# Patient Record
Sex: FEMALE | Race: BLACK OR AFRICAN AMERICAN | Employment: UNEMPLOYED | URBAN - METROPOLITAN AREA
[De-identification: names, ages, dates, MRNs, and addresses within clinical notes are randomized per-mention and may not be internally consistent; named-entity substitution may affect disease eponyms.]

---

## 2020-07-13 NOTE — PROGRESS NOTES
2020    PATIENT: Sean Angelucci  : 2000    Primary Care Provider:   Rachel Mazariegos MD  o:None  f:None      Reason for evaluation:   Chief Complaint   Patient presents with    New Patient     off and on chest pains - happens when laying down or sitting    Chest Pain       History of present illness:   Ms. Sean Angelucci is a 23 y.o. female patient here for first time cardiovascular evaluation regarding recent onset chest pain/palpitations. Michael Painter is preparing to enter her june year in [employer] @ [employer]. She has been home since mid March due to [place] pandemic and states symptoms did not start until two weeks ago. Denies any preceding stressors. States she felt episodes daily while sitting of \"irregular, harder but not faster heartbeats with pain from center to arms and jaws, that sometimes would resolve with coughing and others on their own in several minutes\". She has not exercised in the past three weeks and denies any previous exertional concerns. Denies history of exertional chest pain, dyspnea, palpitations, or syncope. She denies and medical or surgical history. She has been on oral contraceptive pills. Denies ETOH, tobacco, or drug use. States caffeine is minimal with occasional tea drinking. Family history consists of CABG in great grandmother, MI in great grandfather, AFib ablations/cardiomyopathy in maternal grandfather, and ICD in maternal grandmother according to her mother who is here today. No premature ischemic heart disease or SCD reported. Medical History:  History reviewed. No pertinent past medical history. Surgical History:  History reviewed. No pertinent surgical history.     Social History:  Social History     Socioeconomic History    Marital status: Single     Spouse name: Not on file    Number of children: Not on file    Years of education: Not on file    Highest education level: Not on file Occupational History    Not on file   Social Needs    Financial resource strain: Not on file    Food insecurity     Worry: Not on file     Inability: Not on file    Transportation needs     Medical: Not on file     Non-medical: Not on file   Tobacco Use    Smoking status: Never Smoker    Smokeless tobacco: Never Used   Substance and Sexual Activity    Alcohol use: Never     Frequency: Never    Drug use: Never    Sexual activity: Not on file   Lifestyle    Physical activity     Days per week: Not on file     Minutes per session: Not on file    Stress: Not on file   Relationships    Social connections     Talks on phone: Not on file     Gets together: Not on file     Attends Roman Catholic service: Not on file     Active member of club or organization: Not on file     Attends meetings of clubs or organizations: Not on file     Relationship status: Not on file    Intimate partner violence     Fear of current or ex partner: Not on file     Emotionally abused: Not on file     Physically abused: Not on file     Forced sexual activity: Not on file   Other Topics Concern    Not on file   Social History Narrative    Not on file        Family History:  No evidence for sudden cardiac death or premature CAD. Problem Relation Age of Onset    No Known Problems Mother     No Known Problems Father     High Cholesterol Maternal Grandmother     Pacemaker Maternal Grandmother     High Cholesterol Maternal Grandfather     Hypertension Maternal Grandfather     Heart Surgery Maternal Grandfather        Medications:  [x] Medications and dosages reviewed. Prior to Admission medications    Medication Sig Start Date End Date Taking? Authorizing Provider   LO LOESTRIN FE 1 MG-10 MCG / 10 MCG tablet TK 1 T PO QD 7/7/20   Historical Provider, MD       Allergies:  Patient has no known allergies.      Review of Systems:    [x]Full ROS obtained and negative except as mentioned in HPI    Physical Examination:    /72 (Site: Left Upper Arm, Position: Sitting, Cuff Size: Medium Adult)   Pulse 86   Ht 5' 5\" (1.651 m)   Wt 139 lb (63 kg)   SpO2 98%   BMI 23.13 kg/m²   Wt Readings from Last 3 Encounters:   07/14/20 139 lb (63 kg) (67 %, Z= 0.45)*     * Growth percentiles are based on [facility] (Girls, 2-20 Years) data. Vitals:    07/14/20 1032   BP: 100/72   Pulse: 86   SpO2: 98%       · GENERAL: Well developed, well nourished, no acute distress  · NEUROLOGICAL: Alert and oriented x3  · PSYCH: Normal mood and affect   · SKIN: Warm and dry  · HEENT: Normocephalic, atraumatic, Sclera non-icteric, mucous membranes moist  · NECK: supple, JVP normal  · CARDIAC: Normal PMI, regular rate and rhythm, normal S1S2, no murmur, rub, or gallop  · RESPIRATORY: Normal respiratory effort, clear to auscultation bilaterally  · EXTREMITIES: no edema or clubbing, +2 pulses bilaterally   · MUSCULOSKELETAL: No joint swelling or tenderness, no chest wall tenderness  · GASTROINTESTINAL: normal bowel sounds, soft, non-tender      Imaging:  I have reviewed the below testing personally:    EKG 7/14/20  SR      Impression/Recommendations    Ms. Gray Connell is a 23 y.o. female patient with:    1. Chest pain, unspecified type    2. Palpitations    3. Family history of cardiomyopathy      Christine is with recent onset palpitations at rest. She is without high risk features, history, or profile as detailed above. She is leaving with Holter monitor and has echocardiogram scheduled before she leaves for college. We can discuss these results by phone if necessary. She plans to follow up on her annual bloodwork with her PCP. Orders Placed This Encounter   Procedures    Holter monitor 48 hour     2 days     Standing Status:   Future     Standing Expiration Date:   7/14/2021     Order Specific Question:   Reason for Exam?     Answer:   Irregular heart rate     Order Specific Question:   Reason for Exam?     Answer:    Tachycardia    EKG 12 lead     Order Specific Question:   Reason for Exam?     Answer:   Chest pain    Echo 2D w doppler w color complete     Standing Status:   Future     Standing Expiration Date:   7/14/2021     Order Specific Question:   Reason for exam:     Answer:   family history of cardiomyopathy, palpitations     Return for Testing . Patient Instructions   Holter monitor 48 hours  Echocradiogram        Thank you for allowing me to participate in the care of your patient. Please do not hesitate to call. Gamal Sam DO, [facility]  Interventional Cardiology       [address] [facility], [address] [facility]  o: [phone]      NOTE:  This report was transcribed using voice recognition software. Every effort was made to ensure accuracy; however, inadvertent computerized transcription errors may be present. Scribe's Attestation: This note was scribed in the presence of Dr. Mary Jarvis DO by Luis Ash RN.    I, Gamal Sam, have personally performed the services described in this documentation as scribed by Butch Lentz RN in my presence, and it is both accurate and complete. An electronic signature was used to authenticate this note.

## 2020-07-14 ENCOUNTER — OFFICE VISIT (OUTPATIENT)
Dept: CARDIOLOGY CLINIC | Age: 20
End: 2020-07-14
Payer: COMMERCIAL

## 2020-07-14 VITALS
DIASTOLIC BLOOD PRESSURE: 72 MMHG | BODY MASS INDEX: 23.16 KG/M2 | OXYGEN SATURATION: 98 % | HEART RATE: 86 BPM | SYSTOLIC BLOOD PRESSURE: 100 MMHG | HEIGHT: 65 IN | WEIGHT: 139 LBS

## 2020-07-14 PROCEDURE — 1036F TOBACCO NON-USER: CPT | Performed by: INTERNAL MEDICINE

## 2020-07-14 PROCEDURE — 99204 OFFICE O/P NEW MOD 45 MIN: CPT | Performed by: INTERNAL MEDICINE

## 2020-07-14 PROCEDURE — G8427 DOCREV CUR MEDS BY ELIG CLIN: HCPCS | Performed by: INTERNAL MEDICINE

## 2020-07-14 PROCEDURE — G8420 CALC BMI NORM PARAMETERS: HCPCS | Performed by: INTERNAL MEDICINE

## 2020-07-14 PROCEDURE — 93000 ELECTROCARDIOGRAM COMPLETE: CPT | Performed by: INTERNAL MEDICINE

## 2020-07-14 RX ORDER — NORETHINDRONE ACETATE AND ETHINYL ESTRADIOL, ETHINYL ESTRADIOL AND FERROUS FUMARATE 1MG-10(24)
KIT ORAL
COMMUNITY
Start: 2020-07-07

## 2020-07-14 SDOH — HEALTH STABILITY: MENTAL HEALTH: HOW OFTEN DO YOU HAVE A DRINK CONTAINING ALCOHOL?: NEVER

## 2020-07-14 NOTE — LETTER
[address] [place] [address] Cardiology [place] [address] [address] [address]  Phone: [phone]  Fax: [phone]    Jaren HicksDO        July 15, 2020     Joaquina Veronica [phone]    Patient: Tonja Joseph  MR Number: [id number]  YOB: 2000  Date of Visit: 2020    Dear Dr. Joaquina Veronica:                                         2020    PATIENT: Tonja Joseph  : 2000    Primary Care Provider:   Joaquina Veronica MD  o:None  f:None      Reason for evaluation:   Chief Complaint   Patient presents with    New Patient     off and on chest pains - happens when laying down or sitting    Chest Pain       History of present illness:   Ms. Tonja Joseph is a 23 y.o. female patient here for first time cardiovascular evaluation regarding recent onset chest pain/palpitations. Denise Olivarez is preparing to enter her june year in [employer] @ [employer]. She has been home since mid March due to [place] pandemic and states symptoms did not start until two weeks ago. Denies any preceding stressors. States she felt episodes daily while sitting of \"irregular, harder but not faster heartbeats with pain from center to arms and jaws, that sometimes would resolve with coughing and others on their own in several minutes\". She has not exercised in the past three weeks and denies any previous exertional concerns. Denies history of exertional chest pain, dyspnea, palpitations, or syncope. She denies and medical or surgical history. She has been on oral contraceptive pills. Denies ETOH, tobacco, or drug use. States caffeine is minimal with occasional tea drinking. Family history consists of CABG in great grandmother, MI in great grandfather, AFib ablations/cardiomyopathy in maternal grandfather, and ICD in maternal grandmother according to her mother who is here today. No premature ischemic heart disease or SCD reported. [x] Medications and dosages reviewed. Prior to Admission medications    Medication Sig Start Date End Date Taking? Authorizing Provider   LO LOESTRIN FE 1 MG-10 MCG / 10 MCG tablet TK 1 T PO QD 7/7/20   Historical Provider, MD       Allergies:  Patient has no known allergies. Review of Systems:    [x]Full ROS obtained and negative except as mentioned in HPI    Physical Examination:    /72 (Site: Left Upper Arm, Position: Sitting, Cuff Size: Medium Adult)   Pulse 86   Ht 5' 5\" (1.651 m)   Wt 139 lb (63 kg)   SpO2 98%   BMI 23.13 kg/m²   Wt Readings from Last 3 Encounters:   07/14/20 139 lb (63 kg) (67 %, Z= 0.45)*     * Growth percentiles are based on CDC (Girls, 2-20 Years) data. Vitals:    07/14/20 1032   BP: 100/72   Pulse: 86   SpO2: 98%       · GENERAL: Well developed, well nourished, no acute distress  · NEUROLOGICAL: Alert and oriented x3  · PSYCH: Normal mood and affect   · SKIN: Warm and dry  · HEENT: Normocephalic, atraumatic, Sclera non-icteric, mucous membranes moist  · NECK: supple, JVP normal  · CARDIAC: Normal PMI, regular rate and rhythm, normal S1S2, no murmur, rub, or gallop  · RESPIRATORY: Normal respiratory effort, clear to auscultation bilaterally  · EXTREMITIES: no edema or clubbing, +2 pulses bilaterally   · MUSCULOSKELETAL: No joint swelling or tenderness, no chest wall tenderness  · GASTROINTESTINAL: normal bowel sounds, soft, non-tender      Imaging:  I have reviewed the below testing personally:    EKG 7/14/20  SR      Impression/Recommendations    Ms. Aroldo Colon is a 23 y.o. female patient with:    1. Chest pain, unspecified type    2. Palpitations    3. Family history of cardiomyopathy      Christine is with recent onset palpitations at rest. She is without high risk features, history, or profile as detailed above.  She is leaving with Holter monitor and has echocardiogram scheduled before she leaves for [employer] [facility]. We can discuss these results by phone if necessary. She plans to follow up on her annual bloodwork with her PCP. Orders Placed This Encounter   Procedures    Holter monitor 48 hour     2 days     Standing Status:   Future     Standing Expiration Date:   7/14/2021     Order Specific Question:   Reason for Exam?     Answer:   Irregular heart rate     Order Specific Question:   Reason for Exam?     Answer: Tachycardia    EKG 12 lead     Order Specific Question:   Reason for Exam?     Answer:   Chest pain    Echo 2D w doppler w color complete     Standing Status:   Future     Standing Expiration Date:   7/14/2021     Order Specific Question:   Reason for exam:     Answer:   family history of cardiomyopathy, palpitations     Return for Testing . Patient Instructions   Holter monitor 48 hours  Echocradiogram        Thank you for allowing me to participate in the care of your patient. Please do not hesitate to call. Dede Young DO, [facility]  Interventional Cardiology       [facility] [address] [facility], [address] [place] [address]  o: [phone]      NOTE:  This report was transcribed using voice recognition software. Every effort was made to ensure accuracy; however, inadvertent computerized transcription errors may be present. Scribe's Attestation: This note was scribed in the presence of Dr. Mae Vásquez DO by Cipriano Brand, CHRISTINA.    I, Dede Young, have personally performed the services described in this documentation as scribed by Roger Lentz RN in my presence, and it is both accurate and complete. An electronic signature was used to authenticate this note.          2 day holter monitor placed on pt and pt understood instructions        Sincerely,        [address] [place] [address]

## 2020-07-16 ENCOUNTER — HOSPITAL ENCOUNTER (OUTPATIENT)
Dept: NON INVASIVE DIAGNOSTICS | Age: 20
Discharge: HOME | End: 2020-07-16
Payer: COMMERCIAL

## 2020-07-16 LAB
LV EF: 65 %
LVEF MODALITY: NORMAL

## 2020-07-16 PROCEDURE — 93306 TTE W/DOPPLER COMPLETE: CPT

## 2020-07-17 NOTE — RESULT ENCOUNTER NOTE
Echo stable-reviewed by [facility]. Awaiting holter monitor results. Gave results to [facility] via phone.

## 2020-07-27 PROCEDURE — 93224 XTRNL ECG REC UP TO 48 HRS: CPT | Performed by: INTERNAL MEDICINE

## 2020-07-30 ENCOUNTER — TELEPHONE (OUTPATIENT)
Dept: CARDIOLOGY CLINIC | Age: 20
End: 2020-07-30

## 2020-07-30 NOTE — TELEPHONE ENCOUNTER
Pt calling had an heart monitor  couple weeks ago and no one called with the results.  Pls call to advise Thank you

## 2020-08-03 NOTE — RESULT ENCOUNTER NOTE
BNN reviewed- holter stable, SR. Can fu PRN. Attempted to call Christine, no answer unable to leave normal results in VM (see HIPPA form), lvm on home phone for callback to discuss.

## 2024-01-03 ENCOUNTER — HOSPITAL ENCOUNTER (OUTPATIENT)
Dept: PHYSICAL THERAPY | Age: 24
Setting detail: THERAPIES SERIES
Discharge: HOME | End: 2024-01-03
Payer: COMMERCIAL

## 2024-01-03 DIAGNOSIS — M54.50 BILATERAL LOW BACK PAIN WITHOUT SCIATICA, UNSPECIFIED CHRONICITY: Primary | ICD-10-CM

## 2024-01-03 DIAGNOSIS — Z78.9 DEFICIT IN ACTIVITIES OF DAILY LIVING (ADL): ICD-10-CM

## 2024-01-03 PROCEDURE — 97110 THERAPEUTIC EXERCISES: CPT

## 2024-01-03 PROCEDURE — 97161 PT EVAL LOW COMPLEX 20 MIN: CPT

## 2024-01-03 PROCEDURE — 97530 THERAPEUTIC ACTIVITIES: CPT

## 2024-01-03 NOTE — FLOWSHEET NOTE
01/03/2024     Note: If patient does not return for scheduled/recommended follow up visits, this note will serve as a discharge from care along with the most recent update on progress.

## 2024-01-03 NOTE — PLAN OF CARE
carrying tasks  Reduced ability to squat    Participation Restrictions:   Reduced participation in social activities  Reduced participation in sports/recreation    Classification :   Other: S/S consistent with lumbar muscle strain    Barriers to/and or personal factors that will affect rehab potential:   None noted    Prognosis/Rehab Potential:  [] Excellent     [x] Good     [] Fair     [] Poor         Tolerance of evaluation/treatment:   [] Excellent     [x] Good     [] Fair     [] Poor      Physical Therapy Evaluation Complexity Justification  [x] A history of present problem and no personal factors and/or co-morbidities that impact the plan of care  [x] A total of 1-2 elements  found upon examination of body systems using standardized tests and measures addressing any of the following: body structures, functions (impairments), activity limitations, and/or participation restrictions  [x] A clinical presentation with stable and/or uncomplicated characteristics   [x] Clinical decision making of LOW (61398 - Typically 20 minutes face-to-face) complexity using standardized patient assessment instrument and/or measurable assessment of functional outcome.    GOALS     Patient stated goal: \"to get 100% of my body back and be able to move freely and without pain\"  Status: [] Progressing: [] Met: [] Not Met: [] Adjusted    Therapist goals for Patient:   Short Term Goals: To be achieved in: 2 weeks  Independent in HEP and progression per patient tolerance, in order to progress toward full function and prevent re-injury.    Status: [] Progressing: [] Met: [] Not Met: [] Adjusted  Patient will have a decrease in pain to 0/10 to help  facilitate improvement in movement, function, and ADLs as indicated by functional deficits.   Status: [] Progressing: [] Met: [] Not Met: [] Adjusted    Long Term Goals: To be achieved in: 6 weeks  Disability index score of 10% or less for the Modified Oswestry to assist with return top prior

## 2024-01-10 ENCOUNTER — HOSPITAL ENCOUNTER (OUTPATIENT)
Dept: PHYSICAL THERAPY | Age: 24
Setting detail: THERAPIES SERIES
Discharge: HOME | End: 2024-01-10
Payer: COMMERCIAL

## 2024-01-10 PROCEDURE — 97110 THERAPEUTIC EXERCISES: CPT

## 2024-01-10 PROCEDURE — 97112 NEUROMUSCULAR REEDUCATION: CPT

## 2024-01-10 NOTE — FLOWSHEET NOTE
[facility] - Outpatient Rehabilitation and Therapy [address], [place] [address] office: [phone] fax: [phone]      Physical Therapy: TREATMENT/PROGRESS NOTE   Patient: Christine Parikh (23 y.o. female)   Treatment Date: 01/10/2024   :  2000 MRN: [id number]   Visit #: 2   Insurance Allowable Auth Needed   ??? []Yes    []No    Insurance: Payor: BCBS / Plan: BCBS - [place] PPO / Product Type: *No Product type* /   Insurance ID: [id number] - ([place] BCBS)  Secondary Insurance (if applicable):    Treatment Diagnosis:     ICD-10-CM    1. Bilateral low back pain without sciatica, unspecified chronicity  M54.50       2. Deficit in activities of daily living (ADL)  Z78.9          Medical Diagnosis:    Strain of muscle, fascia and tendon of lower back, initial encounter [S39.012A]   Referring Physician: Cipriano Booker  PCP: Kathleen Keys MD                             Plan of care signed (Y/N):     Date of Patient follow up with Physician:      Progress Report/POC: NO  POC update due: (10 visits /OR AUTH LIMITS, whichever is less)  2024      Precautions/Contraindications: n/a    Preferred Language for Healthcare:   [x]English       []other:    SUBJECTIVE EXAMINATION     Patient Report/Comments: Pt states that she has generally been feeling the same since IE. She reports HEP compliance and reports generally no issues -      Test used Initial score  1/3/24 01/10/2024   Pain Summary VAS 0-4/10 0/10   Functional questionnaire Modified Oswestry 7 / 14%     Other:                OBJECTIVE EXAMINATION     Observation:     Test measurements: see eval    Exercises/Interventions:     Therapeutic Ex (41519)  resistance Sets/time Reps Notes/Cues/Progressions          Open book    HEP   SKTC    HEP   Piriformis stretch    HEP   Bridge    HEP   Quadruped thread the needle  1 10 R/L    Leg overs    HEP   Prone superman  5\" 10 110 reports of feeling mild increased pain and compression in

## 2024-01-12 ENCOUNTER — HOSPITAL ENCOUNTER (OUTPATIENT)
Dept: PHYSICAL THERAPY | Age: 24
Setting detail: THERAPIES SERIES
Discharge: HOME | End: 2024-01-12
Payer: COMMERCIAL

## 2024-01-12 PROCEDURE — 97530 THERAPEUTIC ACTIVITIES: CPT

## 2024-01-12 PROCEDURE — 97110 THERAPEUTIC EXERCISES: CPT

## 2024-01-12 NOTE — FLOWSHEET NOTE
[facility] - Outpatient Rehabilitation and Therapy [address], [place] [address] office: [phone] fax: [phone]      Physical Therapy: TREATMENT/PROGRESS NOTE   Patient: Christine Parikh (23 y.o. female)   Treatment Date: 2024   :  2000 MRN: [id number]   Visit #: 3   Insurance Allowable Auth Needed   ??? []Yes    []No    Insurance: Payor: BCBS / Plan: BCBS - [place] PPO / Product Type: *No Product type* /   Insurance ID: [id number] - ([place] BC)  Secondary Insurance (if applicable):    Treatment Diagnosis:     ICD-10-CM    1. Bilateral low back pain without sciatica, unspecified chronicity  M54.50       2. Deficit in activities of daily living (ADL)  Z78.9          Medical Diagnosis:    Strain of muscle, fascia and tendon of lower back, initial encounter [S39.012A]   Referring Physician: Cipriano Booker  PCP: Kathleen Keys MD                             Plan of care signed (Y/N):     Date of Patient follow up with Physician:      Progress Report/POC: NO  POC update due: (10 visits /OR AUTH LIMITS, whichever is less)  2024      Precautions/Contraindications: n/a    Preferred Language for Healthcare:   [x]English       []other:    SUBJECTIVE EXAMINATION     Patient Report/Comments: Pt states that she was feeling sore following last session due to increased muscular engagement exercises. No pain, though. She reports no pain coming into today's session.     Test used Initial score  1/3/24 2024   Pain Summary VAS 0-4/10 0/10   Functional questionnaire Modified Oswestry 7 / 14%     Other:                OBJECTIVE EXAMINATION     Observation:     Test measurements: see eval    Exercises/Interventions:     Therapeutic Ex (14490)  resistance Sets/time Reps Notes/Cues/Progressions          Open book    HEP   SKTC    HEP   Piriformis stretch fig 4  15\" 3 R/L    Bridge    HEP   Quadruped thread the needle  1 10 R/L    Leg overs    HEP   Prone superman  Seated t-ext

## 2024-01-17 ENCOUNTER — HOSPITAL ENCOUNTER (OUTPATIENT)
Dept: PHYSICAL THERAPY | Age: 24
Setting detail: THERAPIES SERIES
Discharge: HOME | End: 2024-01-17
Payer: COMMERCIAL

## 2024-01-17 PROCEDURE — 97112 NEUROMUSCULAR REEDUCATION: CPT

## 2024-01-17 PROCEDURE — 97110 THERAPEUTIC EXERCISES: CPT

## 2024-01-17 NOTE — FLOWSHEET NOTE
[facility] - Outpatient Rehabilitation and Therapy [address], [place] [address] office: [phone] fax: [phone]      Physical Therapy: TREATMENT/PROGRESS NOTE   Patient: Christine Parikh (23 y.o. female)   Treatment Date: 2024   :  2000 MRN: [id number]   Visit #: 4   Insurance Allowable Auth Needed   60 PCY []Yes    [x]No    Insurance: Payor: BCBS / Plan: BCBS - [place] PPO / Product Type: *No Product type* /   Insurance ID: [id number] - ([place] BCBS)  Secondary Insurance (if applicable):    Treatment Diagnosis:     ICD-10-CM    1. Bilateral low back pain without sciatica, unspecified chronicity  M54.50       2. Deficit in activities of daily living (ADL)  Z78.9          Medical Diagnosis:    Strain of muscle, fascia and tendon of lower back, initial encounter [S39.012A]   Referring Physician: Cipriano Booker  PCP: Kathleen Keys MD                             Plan of care signed (Y/N):     Date of Patient follow up with Physician:      Progress Report/POC: NO  POC update due: (10 visits /OR AUTH LIMITS, whichever is less)  2024      Precautions/Contraindications: n/a    Preferred Language for Healthcare:   [x]English       []other:    SUBJECTIVE EXAMINATION     Patient Report/Comments: Pt states that the shoulder has been bothering her more recently      Test used Initial score  1/3/24 2024   Pain Summary VAS 0-4/10 0/10   Functional questionnaire Modified Oswestry 7 / 14%     Other:                OBJECTIVE EXAMINATION     Observation:     Test measurements: see eval    Exercises/Interventions:     Therapeutic Ex (32347)  resistance Sets/time Reps Notes/Cues/Progressions   Open book    HEP   SKTC    HEP   Piriformis stretch fig 4  15\" 3 R/L    Bridge    HEP   Quadruped thread the needle  1 10 R/L    Leg overs    HEP   Prone superman  Seated t-ext with 1/2 FR  Quadratus lumborum stretch  5\" 5 R/L /12   Supine bilat leg raise     trialed but increased

## 2024-01-19 ENCOUNTER — HOSPITAL ENCOUNTER (OUTPATIENT)
Dept: PHYSICAL THERAPY | Age: 24
Setting detail: THERAPIES SERIES
Discharge: HOME | End: 2024-01-19
Payer: COMMERCIAL

## 2024-01-19 PROCEDURE — 97110 THERAPEUTIC EXERCISES: CPT

## 2024-01-19 PROCEDURE — 97112 NEUROMUSCULAR REEDUCATION: CPT

## 2024-01-19 NOTE — FLOWSHEET NOTE
[facility] - Outpatient Rehabilitation and Therapy [address], [place] [address] office: [phone] fax: [phone]      Physical Therapy: TREATMENT/PROGRESS NOTE   Patient: Christine Parikh (23 y.o. female)   Treatment Date: 2024   :  2000 MRN: [id number]   Visit #: 5   Insurance Allowable Auth Needed   60 PCY []Yes    [x]No    Insurance: Payor: BCBS / Plan: BCBS [place] PPO / Product Type: *No Product type* /   Insurance ID: [id number] - ([place] BC)  Secondary Insurance (if applicable):    Treatment Diagnosis:     ICD-10-CM    1. Bilateral low back pain without sciatica, unspecified chronicity  M54.50       2. Deficit in activities of daily living (ADL)  Z78.9          Medical Diagnosis:    Strain of muscle, fascia and tendon of lower back, initial encounter [S39.012A]   Referring Physician: Cipriano Booker  PCP: Kathleen Keys MD                             Plan of care signed (Y/N):     Date of Patient follow up with Physician:      Progress Report/POC: NO  POC update due: (10 visits /OR AUTH LIMITS, whichever is less)  2024      Precautions/Contraindications: n/a    Preferred Language for Healthcare:   [x]English       []other:    SUBJECTIVE EXAMINATION     Patient Report/Comments: Pt states that her low back and bilat hamstrings have been feeling increased soreness following last session, likely due to RDLs last session, but no actual pain.     Test used Initial score  1/3/24 2024   Pain Summary VAS 0-4/10 0/10   Functional questionnaire Modified Oswestry 7 / 14%     Other:                OBJECTIVE EXAMINATION     Observation:     Test measurements: see eval    Exercises/Interventions:     Therapeutic Ex (47416)  resistance Sets/time Reps Notes/Cues/Progressions   Open book    HEP   SKTC    HEP   Piriformis stretch fig 4  15\" 3 R/L    Bridge    HEP   Quadruped thread the needle  1 10 R/L    Leg overs    HEP   Prone superman  Seated t-ext with 1/2 FR

## 2024-01-22 ENCOUNTER — HOSPITAL ENCOUNTER (OUTPATIENT)
Dept: PHYSICAL THERAPY | Age: 24
Setting detail: THERAPIES SERIES
Discharge: HOME | End: 2024-01-22
Payer: COMMERCIAL

## 2024-01-22 PROCEDURE — 97112 NEUROMUSCULAR REEDUCATION: CPT

## 2024-01-22 PROCEDURE — 97110 THERAPEUTIC EXERCISES: CPT

## 2024-01-22 NOTE — FLOWSHEET NOTE
Met: [] Adjusted  Patient will have a decrease in pain to 0/10 to help  facilitate improvement in movement, function, and ADLs as indicated by functional deficits.              Status: [] Progressing: [] Met: [] Not Met: [] Adjusted     Long Term Goals: To be achieved in: 6 weeks  Disability index score of 10% or less for the Modified Oswestry to assist with return top prior level of function.                     Status: [] Progressing: [] Met: [] Not Met: [] Adjusted  Pt will be able to ambulate community distances without increased symptoms or restriction to return to PLOF and hobbies such as recreational walking.  Pt to improve strength to 5/5 of proximal hip without pain to allow for proper muscle and joint use in functional mobility, ADLs and PLOF.  Status: [] Progressing: [] Met: [] Not Met: [] Adjusted  Patient will return to squatting without increased symptoms or restriction to work towards return to prior level of function.  Status: [] Progressing: [] Met: [] Not Met: [] Adjusted  Pt will be able to reduce lumbar pain in order to get back to fitness activities such as yoga, pilates, and weight lifting without restrictions.  Status: [] Progressing: [] Met: [] Not Met: [] Adjusted    Overall Progression Towards Functional goals/ Treatment Progress Update:  [] Patient is progressing as expected towards functional goals listed.    [] Progression is slowed due to complexities/Impairments listed.  [] Progression has been slowed due to co-morbidities.  [x] Plan just implemented, too soon (<30days) to assess goals progression   [] Goals require adjustment due to lack of progress  [] Patient is not progressing as expected and requires additional follow up with physician  [] Other:     CHARGE CAPTURE     PT CHARGE GRID   CPT Code (TIMED) minutes # CPT Code (UNTIMED) #     Therex (57570)  25 2  EVAL:LOW (97356 - Typically 20 minutes face-to-face)     Neuromusc. Re-ed (46339) 15 1  Re-Eval (35809)     Manual (25343)

## 2024-01-24 ENCOUNTER — HOSPITAL ENCOUNTER (OUTPATIENT)
Dept: PHYSICAL THERAPY | Age: 24
Setting detail: THERAPIES SERIES
Discharge: HOME | End: 2024-01-24
Payer: COMMERCIAL

## 2024-01-24 PROCEDURE — 97110 THERAPEUTIC EXERCISES: CPT

## 2024-01-24 PROCEDURE — 97112 NEUROMUSCULAR REEDUCATION: CPT

## 2024-01-24 NOTE — FLOWSHEET NOTE
[facility] - Outpatient Rehabilitation and Therapy [address], [place] [address] office: [phone] fax: [phone]      Physical Therapy: TREATMENT/PROGRESS NOTE   Patient: Christine Parikh (23 y.o. female)   Treatment Date: 2024   :  2000 MRN: [id number]   Visit #: 7   Insurance Allowable Auth Needed   60 PCY []Yes    [x]No    Insurance: Payor: BCBS / Plan: BCBS - [place] PPO / Product Type: *No Product type* /   Insurance ID: [id number] - ([facility])  Secondary Insurance (if applicable):    Treatment Diagnosis:     ICD-10-CM    1. Bilateral low back pain without sciatica, unspecified chronicity  M54.50       2. Deficit in activities of daily living (ADL)  Z78.9          Medical Diagnosis:    Strain of muscle, fascia and tendon of lower back, initial encounter [S39.012A]   Referring Physician: Cipriano Booker  PCP: Kathleen Keys MD                             Plan of care signed (Y/N):     Date of Patient follow up with Physician:      Progress Report/POC: NO  POC update due: (10 visits /OR AUTH LIMITS, whichever is less)  2024      Precautions/Contraindications: n/a    Preferred Language for Healthcare:   [x]English       []other:    SUBJECTIVE EXAMINATION     Patient Report/Comments: Pt states that she had some lingering soreness following last session, but no pain following last session and throughout the weekend.     Test used Initial score  1/3/24 2024   Pain Summary VAS 0-4/10 0/10   Functional questionnaire Modified Oswestry 7 / 14%     Other:                OBJECTIVE EXAMINATION     Observation:     Test measurements: see eval    Exercises/Interventions:     Therapeutic Ex (24667)  resistance Sets/time Reps Notes/Cues/Progressions   Open book    HEP   SKTC    HEP   Piriformis stretch fig 4  15\" 3 R/L    Bridge    HEP   Quadruped thread the needle  1 10 R/L    Leg overs    HEP   Prone superman  Seated t-ext with 1/2 FR  Quadratus lumborum stretch

## 2024-02-02 ENCOUNTER — HOSPITAL ENCOUNTER (OUTPATIENT)
Dept: PHYSICAL THERAPY | Age: 24
Setting detail: THERAPIES SERIES
Discharge: HOME | End: 2024-02-02
Payer: COMMERCIAL

## 2024-02-02 PROCEDURE — 97112 NEUROMUSCULAR REEDUCATION: CPT

## 2024-02-02 PROCEDURE — 97530 THERAPEUTIC ACTIVITIES: CPT

## 2024-02-02 PROCEDURE — 97110 THERAPEUTIC EXERCISES: CPT

## 2024-02-02 NOTE — PLAN OF CARE
[facility] - Outpatient Rehabilitation and Therapy [address], [place] [address] office: [phone] fax: [phone]  Physical Therapy Re-Certification Plan of Care    Dear Cipriano Booker  ,    We had the pleasure of treating the following patient for physical therapy services at [facility]. A summary of our findings can be found in the updated assessment below.  This includes our plan of care.  If you have any questions or concerns regarding these findings, please do not hesitate to contact me at the office phone number checked above.  Thank you for the referral.     Physician Signature:________________________________Date:__________________  By signing above (or electronic signature), therapist's plan is approved by physician      Functional Outcome: Mod MARION = 4 raw score / 8% disability  Christine Parikh 2000 continues to present with functional deficits in endurance of strength  limiting ability with  working out/recreational activities  .  During therapy this date, patient required verbal cueing and progression of exercises and program for exercise progression and improving proper muscle recruitment and activation/motor control patterns. Patient will continue to benefit from ongoing evaluation and advanced clinical decision from a Physical Therapist to improve muscle strength and neuromuscular control to safely return to PLOF and recreational activity without symptoms or restrictions.      Mvmt (norm) AROM L AROM R Notes                  LUMBAR Flex (90) WNL      Ext (25) WNL Less discomfort    SB (25) WNL WNL No pain     Rotation (30) WNL WNL                       HIP Flex (120) WNL WNL      Abd (45) WNL WNL      ER (50)          IR (45)          Ext (20) WNL WNL          MMT L MMT R Notes         HIP Flexion 5 5      Abduction 5 5      ER          IR          Extension 5 5 No pain bilaterally       Overall Response to Treatment:   [x]Patient is

## 2024-02-05 ENCOUNTER — HOSPITAL ENCOUNTER (OUTPATIENT)
Dept: PHYSICAL THERAPY | Age: 24
Setting detail: THERAPIES SERIES
Discharge: HOME | End: 2024-02-05
Payer: COMMERCIAL

## 2024-02-05 PROCEDURE — 97112 NEUROMUSCULAR REEDUCATION: CPT

## 2024-02-05 PROCEDURE — 97110 THERAPEUTIC EXERCISES: CPT

## 2024-02-05 PROCEDURE — 97530 THERAPEUTIC ACTIVITIES: CPT

## 2024-02-05 NOTE — FLOWSHEET NOTE
pilates, and weight lifting without restrictions.  Status: [x] Progressing: [] Met: [] Not Met: [] Adjusted    Overall Progression Towards Functional goals/ Treatment Progress Update:  [x] Patient is progressing as expected towards functional goals listed.    [] Progression is slowed due to complexities/Impairments listed.  [] Progression has been slowed due to co-morbidities.  [] Plan just implemented, too soon (<30days) to assess goals progression   [] Goals require adjustment due to lack of progress  [] Patient is not progressing as expected and requires additional follow up with physician  [] Other:     CHARGE CAPTURE     PT CHARGE GRID   CPT Code (TIMED) minutes # CPT Code (UNTIMED) #     Therex (53775)  10 1  EVAL:LOW (18954 - Typically 20 minutes face-to-face)     Neuromusc. Re-ed (43994) 15 1  Re-Eval (74126)     Manual (31547)    Estim Unattended (04989)     Ther. Act (87561) 15 1  Mech. Traction (41686)     Gait (49244)    Dry Needle 1-2 muscle (72345)     Aquatic Therex (10218)    Dry Needle 3+ muscle (20561)     Iontophoresis (04052)    VASO (88498)     Ultrasound (78862)    Group Therapy (79449)     Estim Attended (53305)    Canalith Repositioning (92827)     Other:    Other:    Total Timed Code Tx Minutes 40 3       Total Treatment Minutes 40        Charge Justification:  (39140) THERAPEUTIC EXERCISE - Provided verbal/tactile cueing for activities related to strengthening, flexibility, endurance, ROM performed to prevent loss of range of motion, maintain or improve muscular strength or increase flexibility, following either an injury or surgery.   (09599) NEUROMUSCULAR RE-EDUCATION - Therapeutic procedure, 1 or more areas, each 15 minutes; neuromuscular reeducation of movement, balance, coordination, kinesthetic sense, posture, and/or proprioception for sitting and/or standing activities  (29421) THERAPEUTIC ACTIVITY - use of dynamic activities to improve functional performance. (Ex include squatting,

## 2024-02-09 ENCOUNTER — APPOINTMENT (OUTPATIENT)
Dept: PHYSICAL THERAPY | Age: 24
End: 2024-02-09
Payer: COMMERCIAL

## 2024-02-12 ENCOUNTER — APPOINTMENT (OUTPATIENT)
Dept: PHYSICAL THERAPY | Age: 24
End: 2024-02-12
Payer: COMMERCIAL

## 2024-02-16 ENCOUNTER — HOSPITAL ENCOUNTER (OUTPATIENT)
Dept: PHYSICAL THERAPY | Age: 24
Setting detail: THERAPIES SERIES
Discharge: HOME | End: 2024-02-16
Payer: COMMERCIAL

## 2024-02-16 PROCEDURE — 97110 THERAPEUTIC EXERCISES: CPT

## 2024-02-16 PROCEDURE — 97112 NEUROMUSCULAR REEDUCATION: CPT

## 2024-02-16 PROCEDURE — 97530 THERAPEUTIC ACTIVITIES: CPT

## 2024-02-16 NOTE — FLOWSHEET NOTE
use of dynamic activities to improve functional performance. (Ex include squatting, ascending/descending stairs, walking, bending, lifting, catching, throwing, pushing, pulling, jumping.)  Direct, one on one contact, billed in 15-minute increments.    TREATMENT PLAN   Plan: Cont POC- Continue emphasis/focus on exercise progression and modulating pain. Next visit plan to progress weights, progress reps, and add new exercises     Electronically Signed by Nadja Ramos PT, DPT              Date: 02/16/2024     Note: If patient does not return for scheduled/recommended follow up visits, this note will serve as a discharge from care along with the most recent update on progress.

## 2024-02-19 ENCOUNTER — HOSPITAL ENCOUNTER (OUTPATIENT)
Dept: PHYSICAL THERAPY | Age: 24
Setting detail: THERAPIES SERIES
Discharge: HOME | End: 2024-02-19
Payer: COMMERCIAL

## 2024-02-19 PROCEDURE — 97112 NEUROMUSCULAR REEDUCATION: CPT

## 2024-02-19 PROCEDURE — 97530 THERAPEUTIC ACTIVITIES: CPT

## 2024-02-19 PROCEDURE — 97110 THERAPEUTIC EXERCISES: CPT

## 2024-02-19 NOTE — FLOWSHEET NOTE
pilates, and weight lifting without restrictions.  Status: [x] Progressing: [] Met: [] Not Met: [] Adjusted    Overall Progression Towards Functional goals/ Treatment Progress Update:  [x] Patient is progressing as expected towards functional goals listed.    [] Progression is slowed due to complexities/Impairments listed.  [] Progression has been slowed due to co-morbidities.  [] Plan just implemented, too soon (<30days) to assess goals progression   [] Goals require adjustment due to lack of progress  [] Patient is not progressing as expected and requires additional follow up with physician  [] Other:     CHARGE CAPTURE     PT CHARGE GRID   CPT Code (TIMED) minutes # CPT Code (UNTIMED) #     Therex (56447)  20 1  EVAL:LOW (20681 - Typically 20 minutes face-to-face)     Neuromusc. Re-ed (62378) 10 1  Re-Eval (43651)     Manual (89022)    Estim Unattended (66341)     Ther. Act (12543) 10 1  Mech. Traction (58843)     Gait (53460)    Dry Needle 1-2 muscle (91469)     Aquatic Therex (51322)    Dry Needle 3+ muscle (20561)     Iontophoresis (71860)    VASO (67559)     Ultrasound (13673)    Group Therapy (96844)     Estim Attended (08765)    Canalith Repositioning (73789)     Other:    Other:    Total Timed Code Tx Minutes 40 3       Total Treatment Minutes 40        Charge Justification:  (63506) THERAPEUTIC EXERCISE - Provided verbal/tactile cueing for activities related to strengthening, flexibility, endurance, ROM performed to prevent loss of range of motion, maintain or improve muscular strength or increase flexibility, following either an injury or surgery.   (39585) NEUROMUSCULAR RE-EDUCATION - Therapeutic procedure, 1 or more areas, each 15 minutes; neuromuscular reeducation of movement, balance, coordination, kinesthetic sense, posture, and/or proprioception for sitting and/or standing activities  (94387) THERAPEUTIC ACTIVITY - use of dynamic activities to improve functional performance. (Ex include squatting,

## 2024-02-27 ENCOUNTER — HOSPITAL ENCOUNTER (OUTPATIENT)
Dept: PHYSICAL THERAPY | Age: 24
Setting detail: THERAPIES SERIES
Discharge: HOME | End: 2024-02-27
Payer: COMMERCIAL

## 2024-02-27 PROCEDURE — 97110 THERAPEUTIC EXERCISES: CPT

## 2024-02-27 PROCEDURE — 97112 NEUROMUSCULAR REEDUCATION: CPT

## 2024-02-27 PROCEDURE — 97530 THERAPEUTIC ACTIVITIES: CPT

## 2024-02-27 NOTE — FLOWSHEET NOTE
0/10 in order to get back to fitness activities such as yoga, pilates, and weight lifting without restrictions.  Status: [x] Progressing: [] Met: [] Not Met: [] Adjusted    Overall Progression Towards Functional goals/ Treatment Progress Update:  [x] Patient is progressing as expected towards functional goals listed.    [] Progression is slowed due to complexities/Impairments listed.  [] Progression has been slowed due to co-morbidities.  [] Plan just implemented, too soon (<30days) to assess goals progression   [] Goals require adjustment due to lack of progress  [] Patient is not progressing as expected and requires additional follow up with physician  [] Other:     CHARGE CAPTURE     PT CHARGE GRID   CPT Code (TIMED) minutes # CPT Code (UNTIMED) #     Therex (66210)  20 1  EVAL:LOW (76009 - Typically 20 minutes face-to-face)     Neuromusc. Re-ed (28641) 10 1  Re-Eval (96487)     Manual (68998)    Estim Unattended (20136)     Ther. Act (84366) 10 1  Mech. Traction (54625)     Gait (51405)    Dry Needle 1-2 muscle (36083)     Aquatic Therex (46424)    Dry Needle 3+ muscle (20561)     Iontophoresis (93799)    VASO (52831)     Ultrasound (93433)    Group Therapy (08882)     Estim Attended (41056)    Canalith Repositioning (25628)     Other:    Other:    Total Timed Code Tx Minutes 40 3       Total Treatment Minutes 40        Charge Justification:  (82114) THERAPEUTIC EXERCISE - Provided verbal/tactile cueing for activities related to strengthening, flexibility, endurance, ROM performed to prevent loss of range of motion, maintain or improve muscular strength or increase flexibility, following either an injury or surgery.   (86637) NEUROMUSCULAR RE-EDUCATION - Therapeutic procedure, 1 or more areas, each 15 minutes; neuromuscular reeducation of movement, balance, coordination, kinesthetic sense, posture, and/or proprioception for sitting and/or standing activities  (59786) THERAPEUTIC ACTIVITY - use of dynamic

## 2024-02-29 ENCOUNTER — HOSPITAL ENCOUNTER (OUTPATIENT)
Dept: PHYSICAL THERAPY | Age: 24
Setting detail: THERAPIES SERIES
Discharge: HOME | End: 2024-02-29
Payer: COMMERCIAL

## 2024-02-29 PROCEDURE — 97530 THERAPEUTIC ACTIVITIES: CPT

## 2024-02-29 PROCEDURE — 97110 THERAPEUTIC EXERCISES: CPT

## 2024-02-29 PROCEDURE — 97112 NEUROMUSCULAR REEDUCATION: CPT

## 2024-02-29 NOTE — DISCHARGE SUMMARY
level of function.  Status: [] Progressing: [x] Met: [] Not Met: [] Adjusted  5.   Pt will be able to reduce lumbar pain to 0/10 in order to get back to fitness activities such as yoga, pilates, and weight lifting without restrictions.  Status: [x] Progressing: [] Met: [] Not Met: [] Adjusted    Overall Progression Towards Functional goals/ Treatment Progress Update:  [x] Patient is progressing as expected towards functional goals listed.    [] Progression is slowed due to complexities/Impairments listed.  [] Progression has been slowed due to co-morbidities.  [] Plan just implemented, too soon (<30days) to assess goals progression   [] Goals require adjustment due to lack of progress  [] Patient is not progressing as expected and requires additional follow up with physician  [] Other:     CHARGE CAPTURE     PT CHARGE GRID   CPT Code (TIMED) minutes # CPT Code (UNTIMED) #     Therex (42817)  15 1  EVAL:LOW (94066 - Typically 20 minutes face-to-face)     Neuromusc. Re-ed (20722) 10 1  Re-Eval (96003)     Manual (14449)    Estim Unattended (11339)     Ther. Act (37029) 15 1  Mech. Traction (38125)     Gait (38604)    Dry Needle 1-2 muscle (75996)     Aquatic Therex (81444)    Dry Needle 3+ muscle (20561)     Iontophoresis (88230)    VASO (50013)     Ultrasound (62516)    Group Therapy (89497)     Estim Attended (79998)    Canalith Repositioning (48387)     Other:    Other:    Total Timed Code Tx Minutes 40 3       Total Treatment Minutes 40        Charge Justification:  (07124) THERAPEUTIC EXERCISE - Provided verbal/tactile cueing for activities related to strengthening, flexibility, endurance, ROM performed to prevent loss of range of motion, maintain or improve muscular strength or increase flexibility, following either an injury or surgery.   (94746) NEUROMUSCULAR RE-EDUCATION - Therapeutic procedure, 1 or more areas, each 15 minutes; neuromuscular reeducation of movement, balance, coordination, kinesthetic sense,

## 2025-01-20 ENCOUNTER — OFFICE VISIT (OUTPATIENT)
Dept: PRIMARY CARE CLINIC | Age: 25
End: 2025-01-20
Payer: COMMERCIAL

## 2025-01-20 VITALS
BODY MASS INDEX: 23.82 KG/M2 | SYSTOLIC BLOOD PRESSURE: 118 MMHG | HEIGHT: 66 IN | TEMPERATURE: 97.2 F | WEIGHT: 148.2 LBS | DIASTOLIC BLOOD PRESSURE: 80 MMHG | HEART RATE: 70 BPM | OXYGEN SATURATION: 100 %

## 2025-01-20 DIAGNOSIS — F32.1 MODERATE MAJOR DEPRESSION (HCC): Primary | ICD-10-CM

## 2025-01-20 DIAGNOSIS — M26.69 CREPITUS OF RIGHT TMJ ON OPENING OF JAW: ICD-10-CM

## 2025-01-20 DIAGNOSIS — N91.2 AMENORRHEA DUE TO ORAL CONTRACEPTIVE: ICD-10-CM

## 2025-01-20 DIAGNOSIS — Z79.3 AMENORRHEA DUE TO ORAL CONTRACEPTIVE: ICD-10-CM

## 2025-01-20 PROCEDURE — 99204 OFFICE O/P NEW MOD 45 MIN: CPT | Performed by: FAMILY MEDICINE

## 2025-01-20 SDOH — ECONOMIC STABILITY: FOOD INSECURITY: WITHIN THE PAST 12 MONTHS, THE FOOD YOU BOUGHT JUST DIDN'T LAST AND YOU DIDN'T HAVE MONEY TO GET MORE.: NEVER TRUE

## 2025-01-20 SDOH — ECONOMIC STABILITY: FOOD INSECURITY: WITHIN THE PAST 12 MONTHS, YOU WORRIED THAT YOUR FOOD WOULD RUN OUT BEFORE YOU GOT MONEY TO BUY MORE.: NEVER TRUE

## 2025-01-20 ASSESSMENT — PATIENT HEALTH QUESTIONNAIRE - PHQ9
SUM OF ALL RESPONSES TO PHQ QUESTIONS 1-9: 17
8. MOVING OR SPEAKING SO SLOWLY THAT OTHER PEOPLE COULD HAVE NOTICED. OR THE OPPOSITE, BEING SO FIGETY OR RESTLESS THAT YOU HAVE BEEN MOVING AROUND A LOT MORE THAN USUAL: NOT AT ALL
SUM OF ALL RESPONSES TO PHQ QUESTIONS 1-9: 17
5. POOR APPETITE OR OVEREATING: SEVERAL DAYS
SUM OF ALL RESPONSES TO PHQ9 QUESTIONS 1 & 2: 6
2. FEELING DOWN, DEPRESSED OR HOPELESS: NEARLY EVERY DAY
9. THOUGHTS THAT YOU WOULD BE BETTER OFF DEAD, OR OF HURTING YOURSELF: NOT AT ALL
4. FEELING TIRED OR HAVING LITTLE ENERGY: NEARLY EVERY DAY
1. LITTLE INTEREST OR PLEASURE IN DOING THINGS: NEARLY EVERY DAY
7. TROUBLE CONCENTRATING ON THINGS, SUCH AS READING THE NEWSPAPER OR WATCHING TELEVISION: SEVERAL DAYS
10. IF YOU CHECKED OFF ANY PROBLEMS, HOW DIFFICULT HAVE THESE PROBLEMS MADE IT FOR YOU TO DO YOUR WORK, TAKE CARE OF THINGS AT HOME, OR GET ALONG WITH OTHER PEOPLE: SOMEWHAT DIFFICULT
3. TROUBLE FALLING OR STAYING ASLEEP: NEARLY EVERY DAY
6. FEELING BAD ABOUT YOURSELF - OR THAT YOU ARE A FAILURE OR HAVE LET YOURSELF OR YOUR FAMILY DOWN: NEARLY EVERY DAY

## 2025-01-20 ASSESSMENT — ENCOUNTER SYMPTOMS
SHORTNESS OF BREATH: 0
CONSTIPATION: 0
DIARRHEA: 0
HOURS OF SLEEP PER NIGHT: 4 HOURS
COUGH: 0
NAUSEA: 0
VOMITING: 0

## 2025-01-20 NOTE — PROGRESS NOTES
Christine Parikh (:  2000) is a 24 y.o. female,New patient, here for evaluation of the following chief complaint(s):  Establish Care and Mental Health Problem    Phq 17  SUBJECTIVE:  1.20.25: new patient    Concerns for mental health  - noticed all of this in   - graduated from college in , then moved to [facility] to do work in   - moved home late last year, not currently working and living at home with her parents    Depression:  Symptoms:  Sadness, hopelessness, irritability, frustration, loss of interest in activities, crying, lack of motivation, More fatigued  Has not worked out in 2 mo which is abnormal for her    Anxiety started in early December, unsure if there was a specific cause  Anxiety -- hard to sleep, only 2-3 hours per night, disturbing dreams that wake her  Lack of energy, reduced appetite with the anxiety. Can feel nauseated and not eat all day    Plan --> start fluoxetine.  Will pay cash price as she does not want her parents to get insurance bill.       Had a back injury in the past for which she had 1 year ago, and she had PT and eval  Right jaw pain, clicking and popping -- saw dentist.  Pain can spread to her right ear  - discussed TMJ of right side, pt to use bite block    OCP -- no periods.  Doing well on those, amenorrheic currently.  Not sexually active      Depression  Visit Type: initial  Onset of symptoms: 1 to 6 months ago  Progression since onset: gradually worsening  Patient presents with the following symptoms: anhedonia, decreased concentration, depressed mood, excessive worry, feelings of hopelessness, feelings of worthlessness, insomnia, memory impairment, nervousness/anxiety and restlessness.  Patient is not experiencing: shortness of breath.  Frequency of symptoms: most days   Severity: moderate   Aggravated by: nothing  Sleep per night: 4 hours  Sleep quality: poor  Nighttime awakenings: one to two  Risk factors: no known risk factors  Treatment tried:

## 2025-01-23 DIAGNOSIS — F32.1 MODERATE MAJOR DEPRESSION (HCC): ICD-10-CM

## 2025-01-23 LAB
25(OH)D3 SERPL-MCNC: 37.9 NG/ML
ALBUMIN SERPL-MCNC: 4.5 G/DL (ref 3.4–5)
ALBUMIN/GLOB SERPL: 1.4 {RATIO} (ref 1.1–2.2)
ALP SERPL-CCNC: 38 U/L (ref 40–129)
ALT SERPL-CCNC: 14 U/L (ref 10–40)
ANION GAP SERPL CALCULATED.3IONS-SCNC: 10 MMOL/L (ref 3–16)
AST SERPL-CCNC: 16 U/L (ref 15–37)
BASOPHILS # BLD: 0 K/UL (ref 0–0.2)
BASOPHILS NFR BLD: 0.9 %
BILIRUB DIRECT SERPL-MCNC: 0.2 MG/DL (ref 0–0.3)
BILIRUB INDIRECT SERPL-MCNC: 0.5 MG/DL (ref 0–1)
BILIRUB SERPL-MCNC: 0.7 MG/DL (ref 0–1)
BUN SERPL-MCNC: 13 MG/DL (ref 7–20)
CALCIUM SERPL-MCNC: 9.8 MG/DL (ref 8.3–10.6)
CHLORIDE SERPL-SCNC: 101 MMOL/L (ref 99–110)
CO2 SERPL-SCNC: 27 MMOL/L (ref 21–32)
CREAT SERPL-MCNC: 1 MG/DL (ref 0.6–1.1)
DEPRECATED RDW RBC AUTO: 12.2 % (ref 12.4–15.4)
EOSINOPHIL # BLD: 0.1 K/UL (ref 0–0.6)
EOSINOPHIL NFR BLD: 2 %
FERRITIN SERPL IA-MCNC: 57.8 NG/ML (ref 15–150)
FOLATE SERPL-MCNC: >40 NG/ML (ref 4.78–24.2)
GFR SERPLBLD CREATININE-BSD FMLA CKD-EPI: 80 ML/MIN/{1.73_M2}
GLUCOSE SERPL-MCNC: 79 MG/DL (ref 70–99)
HCT VFR BLD AUTO: 42.7 % (ref 36–48)
HGB BLD-MCNC: 14.7 G/DL (ref 12–16)
IRON SATN MFR SERPL: 48 % (ref 15–50)
IRON SERPL-MCNC: 154 UG/DL (ref 37–145)
LYMPHOCYTES # BLD: 1.9 K/UL (ref 1–5.1)
LYMPHOCYTES NFR BLD: 43.6 %
MCH RBC QN AUTO: 31.1 PG (ref 26–34)
MCHC RBC AUTO-ENTMCNC: 34.5 G/DL (ref 31–36)
MCV RBC AUTO: 90.2 FL (ref 80–100)
MONOCYTES # BLD: 0.4 K/UL (ref 0–1.3)
MONOCYTES NFR BLD: 8.4 %
NEUTROPHILS # BLD: 2 K/UL (ref 1.7–7.7)
NEUTROPHILS NFR BLD: 45.1 %
PLATELET # BLD AUTO: 435 K/UL (ref 135–450)
PMV BLD AUTO: 8.7 FL (ref 5–10.5)
POTASSIUM SERPL-SCNC: 4.1 MMOL/L (ref 3.5–5.1)
PROT SERPL-MCNC: 7.7 G/DL (ref 6.4–8.2)
RBC # BLD AUTO: 4.74 M/UL (ref 4–5.2)
SODIUM SERPL-SCNC: 138 MMOL/L (ref 136–145)
TIBC SERPL-MCNC: 322 UG/DL (ref 260–445)
TSH SERPL DL<=0.005 MIU/L-ACNC: 2.88 UIU/ML (ref 0.27–4.2)
VIT B12 SERPL-MCNC: 570 PG/ML (ref 211–911)
WBC # BLD AUTO: 4.4 K/UL (ref 4–11)

## 2025-02-24 ENCOUNTER — OFFICE VISIT (OUTPATIENT)
Dept: PRIMARY CARE CLINIC | Age: 25
End: 2025-02-24
Payer: COMMERCIAL

## 2025-02-24 VITALS
SYSTOLIC BLOOD PRESSURE: 116 MMHG | BODY MASS INDEX: 24.08 KG/M2 | OXYGEN SATURATION: 96 % | DIASTOLIC BLOOD PRESSURE: 80 MMHG | HEIGHT: 66 IN | TEMPERATURE: 96.6 F | WEIGHT: 149.8 LBS | HEART RATE: 75 BPM

## 2025-02-24 DIAGNOSIS — F32.1 MODERATE MAJOR DEPRESSION (HCC): Primary | ICD-10-CM

## 2025-02-24 PROCEDURE — 99214 OFFICE O/P EST MOD 30 MIN: CPT | Performed by: FAMILY MEDICINE

## 2025-02-24 RX ORDER — FLUOXETINE HYDROCHLORIDE 40 MG/1
40 CAPSULE ORAL DAILY
Qty: 90 CAPSULE | Refills: 1 | Status: SHIPPED | OUTPATIENT
Start: 2025-02-24

## 2025-02-24 ASSESSMENT — ENCOUNTER SYMPTOMS
COUGH: 0
VOMITING: 0
NAUSEA: 0
CONSTIPATION: 0
SHORTNESS OF BREATH: 0
DIARRHEA: 0
HOURS OF SLEEP PER NIGHT: 4 HOURS

## 2025-02-24 ASSESSMENT — PATIENT HEALTH QUESTIONNAIRE - PHQ9
SUM OF ALL RESPONSES TO PHQ QUESTIONS 1-9: 14
6. FEELING BAD ABOUT YOURSELF - OR THAT YOU ARE A FAILURE OR HAVE LET YOURSELF OR YOUR FAMILY DOWN: NOT AT ALL
5. POOR APPETITE OR OVEREATING: NOT AT ALL
7. TROUBLE CONCENTRATING ON THINGS, SUCH AS READING THE NEWSPAPER OR WATCHING TELEVISION: SEVERAL DAYS
10. IF YOU CHECKED OFF ANY PROBLEMS, HOW DIFFICULT HAVE THESE PROBLEMS MADE IT FOR YOU TO DO YOUR WORK, TAKE CARE OF THINGS AT HOME, OR GET ALONG WITH OTHER PEOPLE: SOMEWHAT DIFFICULT
2. FEELING DOWN, DEPRESSED OR HOPELESS: NEARLY EVERY DAY
SUM OF ALL RESPONSES TO PHQ QUESTIONS 1-9: 14
SUM OF ALL RESPONSES TO PHQ9 QUESTIONS 1 & 2: 6
SUM OF ALL RESPONSES TO PHQ QUESTIONS 1-9: 14
8. MOVING OR SPEAKING SO SLOWLY THAT OTHER PEOPLE COULD HAVE NOTICED. OR THE OPPOSITE, BEING SO FIGETY OR RESTLESS THAT YOU HAVE BEEN MOVING AROUND A LOT MORE THAN USUAL: SEVERAL DAYS
1. LITTLE INTEREST OR PLEASURE IN DOING THINGS: NEARLY EVERY DAY
4. FEELING TIRED OR HAVING LITTLE ENERGY: NEARLY EVERY DAY
9. THOUGHTS THAT YOU WOULD BE BETTER OFF DEAD, OR OF HURTING YOURSELF: NOT AT ALL
SUM OF ALL RESPONSES TO PHQ QUESTIONS 1-9: 14
3. TROUBLE FALLING OR STAYING ASLEEP: NEARLY EVERY DAY

## 2025-02-24 NOTE — PROGRESS NOTES
pm  Friday 7:00 am to 3:30 pm  Saturdays, Sundays, and after hours: E-Visits are available    We observe most federal holidays and Good Friday.    We ask that you only contact the office one time per issue or question, and please allow one full business day for a call back. Calling us back multiple times keeps us from being able to complete the work efficiently for you and our other patients.    For medication renewals, please call your pharmacist to contact us, and be sure to allow at least 3 business days for processing before you need to  your medication.     If you are sick or need an appointment that hasn't been planned, same day appointments are available every day the office is open: Monday, Tuesday, Wednesday, Thursday, and Friday.  Call during office hours to schedule, even if it may not be with your regular physician. You may also call the office after 8 am on office days if you need to be seen from an issue the night before.    During hours when the office is not normally open, your call will go to the messaging service which cannot provide any service other than paging the doctor. No prescriptions or other nonurgent matters will be handled and no voicemail is available, so please call back during office hours for these matters.       Electronically signed by Elzbieta Vazquez DO on 2/24/2025 at 3:29 PM.

## 2025-04-14 ENCOUNTER — OFFICE VISIT (OUTPATIENT)
Dept: PRIMARY CARE CLINIC | Age: 25
End: 2025-04-14
Payer: COMMERCIAL

## 2025-04-14 VITALS
OXYGEN SATURATION: 97 % | TEMPERATURE: 97.6 F | SYSTOLIC BLOOD PRESSURE: 110 MMHG | WEIGHT: 151.8 LBS | DIASTOLIC BLOOD PRESSURE: 74 MMHG | BODY MASS INDEX: 24.4 KG/M2 | HEART RATE: 85 BPM | HEIGHT: 66 IN

## 2025-04-14 DIAGNOSIS — F32.1 MODERATE MAJOR DEPRESSION (HCC): Primary | ICD-10-CM

## 2025-04-14 DIAGNOSIS — J30.2 SEASONAL ALLERGIES: ICD-10-CM

## 2025-04-14 DIAGNOSIS — G47.09 OTHER INSOMNIA: ICD-10-CM

## 2025-04-14 PROCEDURE — 99214 OFFICE O/P EST MOD 30 MIN: CPT | Performed by: FAMILY MEDICINE

## 2025-04-14 ASSESSMENT — ENCOUNTER SYMPTOMS
DIARRHEA: 0
SHORTNESS OF BREATH: 0
COUGH: 0
VOMITING: 0
HOURS OF SLEEP PER NIGHT: 4 HOURS
NAUSEA: 0
CONSTIPATION: 0

## 2025-04-14 NOTE — PROGRESS NOTES
Christine Parikh (:  2000) is a 24 y.o. female,Established patient, here for evaluation of the following chief complaint(s):  Medication Check (6 week follow up /Pt has been experiencing side affects ( itchiness, heartburn))      SUBJECTIVE:  4.14.25:    Not helping with fluoxetine at 40 mg      Sleep not improved:  - tryin to cut out naps  Sleeping better,   Can try melatonin 1-2 mg at night, 2 hours before bed  If not, will try trazodone or mirtazapine.     Seasonal allergies:  Can use zyrtec or allegra    Subjective:  - Fluoxetine 40mg causing side effects: itching (head to toe), nausea, heartburn, gas  - Sleep issues: difficulty falling asleep at desired time, waking throughout night, sleeping until 10-12pm  - Reports hallucinations when waking during night (sees person standing there)  - Seasonal allergies  - Questions about OTC allergy meds compatible with new medication  - Questions about cold/flu meds with new medication  - Questions about birth control pills and period management    Past Medical History:  - Depression/anxiety on fluoxetine (Prozac) 40mg, previously 20mg  - On oral contraceptives ([employer]ulule[employer] brand)    Objective:  - No physical exam performed during visit    Assessment & Plan:  1. Depression/Anxiety  - Discontinue fluoxetine 40mg due to side effects and lack of efficacy  - Start sertraline (Zoloft) 50mg daily  - Discussed using [employer] for cash price ($6-12 for 30 tabs)  - Follow up in 1 month    2. Insomnia  - Recommend trial of melatonin 1-2mg taken 2 hours before desired bedtime  - Discussed possible prescription options if melatonin ineffective: low-dose trazodone or mirtazapine  - Hallucinations likely related to poor sleep or medication side effect  - Monitor hallucinations with medication change    3. Seasonal Allergies  - Recommended Zyrtec (cetirizine) or Allegra (fexofenadine) over Claritin  - Advised standard formulation rather than decongestant (-D) versions  - Confirmed

## 2025-05-22 ENCOUNTER — OFFICE VISIT (OUTPATIENT)
Dept: PRIMARY CARE CLINIC | Age: 25
End: 2025-05-22
Payer: COMMERCIAL

## 2025-05-22 VITALS
SYSTOLIC BLOOD PRESSURE: 112 MMHG | DIASTOLIC BLOOD PRESSURE: 78 MMHG | OXYGEN SATURATION: 98 % | BODY MASS INDEX: 24.53 KG/M2 | TEMPERATURE: 98.4 F | HEIGHT: 66 IN | HEART RATE: 73 BPM | WEIGHT: 152.6 LBS

## 2025-05-22 DIAGNOSIS — G47.09 OTHER INSOMNIA: ICD-10-CM

## 2025-05-22 DIAGNOSIS — S03.00XD DISLOCATION OF TEMPOROMANDIBULAR JOINT, SUBSEQUENT ENCOUNTER: ICD-10-CM

## 2025-05-22 DIAGNOSIS — F32.1 MODERATE MAJOR DEPRESSION (HCC): Primary | ICD-10-CM

## 2025-05-22 DIAGNOSIS — F43.21 GRIEF: ICD-10-CM

## 2025-05-22 PROBLEM — S03.00XA DISLOCATION OF JAW: Status: ACTIVE | Noted: 2025-05-22

## 2025-05-22 PROCEDURE — 99214 OFFICE O/P EST MOD 30 MIN: CPT | Performed by: FAMILY MEDICINE

## 2025-05-22 RX ORDER — SERTRALINE HYDROCHLORIDE 100 MG/1
100 TABLET, FILM COATED ORAL DAILY
Qty: 90 TABLET | Refills: 0 | Status: SHIPPED | OUTPATIENT
Start: 2025-05-22 | End: 2025-08-20

## 2025-05-22 ASSESSMENT — ENCOUNTER SYMPTOMS
SHORTNESS OF BREATH: 0
VOMITING: 0
DIARRHEA: 0
CONSTIPATION: 0
NAUSEA: 0
COUGH: 0
HOURS OF SLEEP PER NIGHT: 4 HOURS

## 2025-05-22 NOTE — PROGRESS NOTES
for 30 tabs)  - Follow up in 1 month    2. Insomnia  - Recommend trial of melatonin 1-2mg taken 2 hours before desired bedtime  - Discussed possible prescription options if melatonin ineffective: low-dose trazodone or mirtazapine  - Hallucinations likely related to poor sleep or medication side effect  - Monitor hallucinations with medication change    3. Seasonal Allergies  - Recommended Zyrtec (cetirizine) or Allegra (fexofenadine) over Claritin  - Advised standard formulation rather than decongestant (-D) versions  - Confirmed compatibility with sertraline  - Confirmed cold/flu medications compatible with sertraline    4. Birth Control Management  - Advised can skip iron pills (last two brown pills in pack) to avoid periods  - Current gynecologist follow-up scheduled               2.24.25:    Subjective:  - Reports no improvement on fluoxetine 20mg  - Initial adjustment period with side effects: anxiety, nervousness, paranoia, guilt, intrusive thoughts, loss of control, sleep disturbance (max 3 hours/night)  - Side effects resolved after 2 weeks but returned to baseline depression  - Currently sleeping poorly, affecting recovery and exercise capacity    Past Medical History:  - Takes fluoxetine    Objective:  - Alert and engaged in conversation  - Appropriate affect    Assessment & Plan:  1. Depression  - Increase fluoxetine to 40mg daily, printed rx  - Can take two 20mg capsules until current supply exhausted  - New prescription provided for 40mg capsules  - Follow up in 5-6 weeks or sooner if needed    2. Sleep Disturbance  - Counseled on sleep hygiene and importance of daily schedule:  - Set consistent wake time (suggested 9:00 AM)  - Regular meal times starting with breakfast  - Scheduled physical activity at 1:00 PM  - Avoid daytime naps  - Set bedtime for 9:30 PM  - Structure day with planned activities  - Will consider sleep medication if schedule changes ineffective    3. Exercise Goals  - Currently

## 2025-07-03 ENCOUNTER — OFFICE VISIT (OUTPATIENT)
Dept: PRIMARY CARE CLINIC | Age: 25
End: 2025-07-03
Payer: COMMERCIAL

## 2025-07-03 VITALS
BODY MASS INDEX: 24.36 KG/M2 | OXYGEN SATURATION: 97 % | WEIGHT: 151.6 LBS | HEIGHT: 66 IN | DIASTOLIC BLOOD PRESSURE: 74 MMHG | SYSTOLIC BLOOD PRESSURE: 112 MMHG | HEART RATE: 66 BPM | TEMPERATURE: 98.6 F

## 2025-07-03 DIAGNOSIS — F32.1 MODERATE MAJOR DEPRESSION (HCC): Primary | ICD-10-CM

## 2025-07-03 PROCEDURE — 99214 OFFICE O/P EST MOD 30 MIN: CPT | Performed by: FAMILY MEDICINE

## 2025-07-03 RX ORDER — SERTRALINE HYDROCHLORIDE 25 MG/1
25 TABLET, FILM COATED ORAL DAILY
Qty: 90 TABLET | Refills: 1 | Status: SHIPPED | OUTPATIENT
Start: 2025-07-03

## 2025-07-03 ASSESSMENT — ENCOUNTER SYMPTOMS
CONSTIPATION: 0
COUGH: 0
VOMITING: 0
DIARRHEA: 0
NAUSEA: 0
HOURS OF SLEEP PER NIGHT: 4 HOURS
SHORTNESS OF BREATH: 0

## 2025-07-03 NOTE — PROGRESS NOTES
Christine Parikh (:  2000) is a 24 y.o. female,Established patient, here for evaluation of the following chief complaint(s):  Medication Check (Zoloft )    Family hx of thyroid issues, expect yearly evaluations of TSH    SUBJECTIVE:  7.3.25:  -- will increase to 75 mg  (100 mg split in half + 50) or 25 mg + 50 tab    Subjective:  - Christine Parikh has been experiencing nausea and worsened sleep from fluoxetine, and she reported being very itchy after taking the medication.  - She had an eye appointment where the ophthalmologist indicated potential genetic predisposition to a thyroid issue, suggesting an annual check of thyroid function.    Past Medical History:  - Family history: Christine Parikh's grandmother has thyroid issues.  - Christine Parikh's thyroid function was checked in January and was within normal limits.    Objective:  - No exophthalmos or eye protrusion was observed, which can be associated with Graves' disease in some individuals with thyroid issues.    Assessment & Plan:  1. Fluoxetine side effects  - Assessment: Side effects likely related to current fluoxetine dosage.  - Treatment planned:  - Christine Parikh should cut the 100 mg fluoxetine tablets in half to achieve a 50 mg dose, and then take one-and-a-half of these halves to achieve a 75 mg dose, which may alleviate the reported side effects.  - A prescription for fluoxetine 25 mg will be written and printed for Christine Parikh to use if cutting the tablets is not feasible or if she prefers to adjust the dosage in smaller increments.  - Counseling was provided regarding the flexibility of dosing and the option to use the 25 mg prescription if cutting the existing tablets proves difficult or ineffective.  - The possibility of adding another medication to the SSRI regimen will be considered if the dosage adjustment of fluoxetine does not adequately address the symptoms, but other options must be exhausted first.    2. Potential thyroid issues  - Assessment:

## 2025-08-22 DIAGNOSIS — F32.1 MODERATE MAJOR DEPRESSION (HCC): ICD-10-CM
